# Patient Record
Sex: FEMALE | Race: BLACK OR AFRICAN AMERICAN | Employment: FULL TIME | URBAN - METROPOLITAN AREA
[De-identification: names, ages, dates, MRNs, and addresses within clinical notes are randomized per-mention and may not be internally consistent; named-entity substitution may affect disease eponyms.]

---

## 2020-10-21 RX ORDER — SODIUM CHLORIDE 9 MG/ML
25 INJECTION, SOLUTION INTRAVENOUS CONTINUOUS
Status: DISCONTINUED | OUTPATIENT
Start: 2020-10-27 | End: 2020-10-28 | Stop reason: HOSPADM

## 2020-10-27 ENCOUNTER — HOSPITAL ENCOUNTER (OUTPATIENT)
Dept: INFUSION THERAPY | Age: 30
Discharge: HOME | End: 2020-10-27
Payer: COMMERCIAL

## 2020-10-27 VITALS
TEMPERATURE: 98.2 F | RESPIRATION RATE: 16 BRPM | SYSTOLIC BLOOD PRESSURE: 100 MMHG | HEART RATE: 74 BPM | DIASTOLIC BLOOD PRESSURE: 59 MMHG

## 2020-10-27 PROCEDURE — 74011000258 HC RX REV CODE- 258: Performed by: OBSTETRICS & GYNECOLOGY

## 2020-10-27 PROCEDURE — 96365 THER/PROPH/DIAG IV INF INIT: CPT

## 2020-10-27 PROCEDURE — 74011250636 HC RX REV CODE- 250/636: Performed by: OBSTETRICS & GYNECOLOGY

## 2020-10-27 RX ORDER — SODIUM CHLORIDE 9 MG/ML
25 INJECTION, SOLUTION INTRAVENOUS CONTINUOUS
Status: DISCONTINUED | OUTPATIENT
Start: 2020-11-03 | End: 2020-11-04 | Stop reason: HOSPADM

## 2020-10-27 RX ADMIN — FERUMOXYTOL 510 MG: 510 INJECTION INTRAVENOUS at 16:35

## 2020-10-27 RX ADMIN — SODIUM CHLORIDE 25 ML/HR: 900 INJECTION, SOLUTION INTRAVENOUS at 16:15

## 2020-10-27 NOTE — PROGRESS NOTES
[facility] Short Visit Progress Note    1600 Pt admit to [facility] for Feraheme/Day 1 ambulatory in stable condition. Assessment completed. No new concerns voiced. PIV access established in L arm with positive blood return; line flushed, NS infusing. Patient Vitals for the past 12 hrs:   Temp Pulse Resp BP   10/27/20 1600 98.2 °F (36.8 °C) 79 18 104/63       Medications Administered     0.9% sodium chloride infusion     Admin Date  10/27/2020 Action  New Bag Dose  25 mL/hr Rate  25 mL/hr Route  IntraVENous Administered By  Juliet Reddy, ALYCE          ferumoxytoL [facility]) 510 mg in 0.9% sodium chloride 100 mL, overfill volume 10 mL IVPB     Admin Date  10/27/2020 Action  Given Dose  510 mg Rate  508 mL/hr Route  IntraVENous Administered By  Juliet Reddy, RN                6853 Pt tolerated treatment well. PIV removed at discharge. D/c home ambulatory in no distress. Pt aware of next appointment scheduled for 11/03/2020.

## 2020-11-03 ENCOUNTER — HOSPITAL ENCOUNTER (OUTPATIENT)
Dept: INFUSION THERAPY | Age: 30
Discharge: HOME | End: 2020-11-03
Payer: COMMERCIAL

## 2020-11-03 VITALS
SYSTOLIC BLOOD PRESSURE: 100 MMHG | TEMPERATURE: 97.5 F | RESPIRATION RATE: 16 BRPM | HEART RATE: 78 BPM | DIASTOLIC BLOOD PRESSURE: 60 MMHG

## 2020-11-03 PROCEDURE — 96374 THER/PROPH/DIAG INJ IV PUSH: CPT

## 2020-11-03 PROCEDURE — 74011250636 HC RX REV CODE- 250/636: Performed by: OBSTETRICS & GYNECOLOGY

## 2020-11-03 PROCEDURE — 74011000258 HC RX REV CODE- 258: Performed by: OBSTETRICS & GYNECOLOGY

## 2020-11-03 RX ADMIN — SODIUM CHLORIDE 25 ML/HR: 900 INJECTION, SOLUTION INTRAVENOUS at 16:44

## 2020-11-03 RX ADMIN — FERUMOXYTOL 510 MG: 510 INJECTION INTRAVENOUS at 16:44

## 2020-11-03 NOTE — PROGRESS NOTES
[facility] Short Note                   1600 Pt admit to [facility] for Feraheme (2/2)  ambulatory in stable condition. Assessment completed. No new concerns voiced. 24G PIV established in left AC and connected to normal saline KVO. Patient Vitals for the past 12 hrs:   Temp Pulse Resp BP   11/03/20 1705  78  100/60   11/03/20 1608 97.5 °F (36.4 °C) 70 16 (!) 98/57       Medications Administered     0.9% sodium chloride infusion     Admin Date  11/03/2020 Action  New Bag Dose  25 mL/hr Rate  25 mL/hr Route  IntraVENous Administered By  Lane Barker RN          ferumoxytoL [facility]) 510 mg in 0.9% sodium chloride 100 mL, overfill volume 10 mL IVPB     Admin Date  11/03/2020 Action  Given Dose  510 mg Rate  508 mL/hr Route  IntraVENous Administered By  Lane Barker, RN              Ms. Hernandez Garcia tolerated the infusion, and had no complaints. PIV removed without difficulty. Arm wrapped with 2x2 and coban. Pt refused to stay for 30 min observation period. Ms. Hernandez Garcia was discharged from [place] [address] in stable condition at [phone]. No future appointments.     Ml Wilcox, RN  November 3, 2020